# Patient Record
Sex: MALE | Race: BLACK OR AFRICAN AMERICAN | NOT HISPANIC OR LATINO | Employment: FULL TIME | URBAN - METROPOLITAN AREA
[De-identification: names, ages, dates, MRNs, and addresses within clinical notes are randomized per-mention and may not be internally consistent; named-entity substitution may affect disease eponyms.]

---

## 2023-08-01 DIAGNOSIS — M25.551 BILATERAL HIP PAIN: Primary | ICD-10-CM

## 2023-08-01 DIAGNOSIS — M25.552 BILATERAL HIP PAIN: Primary | ICD-10-CM

## 2023-08-07 ENCOUNTER — OFFICE VISIT (OUTPATIENT)
Dept: ORTHOPEDICS | Facility: CLINIC | Age: 53
End: 2023-08-07
Payer: COMMERCIAL

## 2023-08-07 ENCOUNTER — HOSPITAL ENCOUNTER (OUTPATIENT)
Dept: RADIOLOGY | Facility: HOSPITAL | Age: 53
Discharge: HOME | End: 2023-08-07
Attending: ORTHOPAEDIC SURGERY
Payer: COMMERCIAL

## 2023-08-07 VITALS — BODY MASS INDEX: 47.74 KG/M2 | WEIGHT: 315 LBS | HEIGHT: 68 IN

## 2023-08-07 DIAGNOSIS — Z96.642 HX OF TOTAL HIP ARTHROPLASTY, LEFT: ICD-10-CM

## 2023-08-07 DIAGNOSIS — M25.552 BILATERAL HIP PAIN: ICD-10-CM

## 2023-08-07 DIAGNOSIS — M54.17 LUMBOSACRAL RADICULOPATHY: Primary | ICD-10-CM

## 2023-08-07 DIAGNOSIS — Z96.641 H/O TOTAL HIP ARTHROPLASTY, RIGHT: ICD-10-CM

## 2023-08-07 DIAGNOSIS — Z87.39 HISTORY OF RIGHT FOOT DROP: ICD-10-CM

## 2023-08-07 DIAGNOSIS — R25.2 MUSCLE CRAMPS AT NIGHT: ICD-10-CM

## 2023-08-07 DIAGNOSIS — M25.551 BILATERAL HIP PAIN: ICD-10-CM

## 2023-08-07 PROBLEM — M19.90 OSTEOARTHRITIS: Status: ACTIVE | Noted: 2023-08-07

## 2023-08-07 PROBLEM — E78.00 PURE HYPERCHOLESTEROLEMIA: Status: ACTIVE | Noted: 2022-12-13

## 2023-08-07 PROBLEM — N18.32 STAGE 3B CHRONIC KIDNEY DISEASE: Status: ACTIVE | Noted: 2021-10-04

## 2023-08-07 PROBLEM — K21.00 GASTROESOPHAGEAL REFLUX DISEASE WITH ESOPHAGITIS WITHOUT HEMORRHAGE: Status: ACTIVE | Noted: 2023-08-07

## 2023-08-07 PROBLEM — I10 HYPERTENSIVE DISORDER: Status: ACTIVE | Noted: 2023-08-07

## 2023-08-07 PROBLEM — S83.8X2A SPRAIN OF OTHER SPECIFIED PARTS OF LEFT KNEE, INITIAL ENCOUNTER: Status: ACTIVE | Noted: 2023-07-10

## 2023-08-07 PROCEDURE — 73521 X-RAY EXAM HIPS BI 2 VIEWS: CPT | Mod: 26,,, | Performed by: RADIOLOGY

## 2023-08-07 PROCEDURE — 99999 PR PBB SHADOW E&M-EST. PATIENT-LVL IV: ICD-10-PCS | Mod: PBBFAC,,, | Performed by: ORTHOPAEDIC SURGERY

## 2023-08-07 PROCEDURE — 3008F BODY MASS INDEX DOCD: CPT | Mod: CPTII,S$GLB,, | Performed by: ORTHOPAEDIC SURGERY

## 2023-08-07 PROCEDURE — 1159F PR MEDICATION LIST DOCUMENTED IN MEDICAL RECORD: ICD-10-PCS | Mod: CPTII,S$GLB,, | Performed by: ORTHOPAEDIC SURGERY

## 2023-08-07 PROCEDURE — 99204 PR OFFICE/OUTPT VISIT, NEW, LEVL IV, 45-59 MIN: ICD-10-PCS | Mod: S$GLB,,, | Performed by: ORTHOPAEDIC SURGERY

## 2023-08-07 PROCEDURE — 3008F PR BODY MASS INDEX (BMI) DOCUMENTED: ICD-10-PCS | Mod: CPTII,S$GLB,, | Performed by: ORTHOPAEDIC SURGERY

## 2023-08-07 PROCEDURE — 99999 PR PBB SHADOW E&M-EST. PATIENT-LVL IV: CPT | Mod: PBBFAC,,, | Performed by: ORTHOPAEDIC SURGERY

## 2023-08-07 PROCEDURE — 1159F MED LIST DOCD IN RCRD: CPT | Mod: CPTII,S$GLB,, | Performed by: ORTHOPAEDIC SURGERY

## 2023-08-07 PROCEDURE — 99204 OFFICE O/P NEW MOD 45 MIN: CPT | Mod: S$GLB,,, | Performed by: ORTHOPAEDIC SURGERY

## 2023-08-07 PROCEDURE — 73521 X-RAY EXAM HIPS BI 2 VIEWS: CPT | Mod: TC

## 2023-08-07 PROCEDURE — 73521 XR HIPS BILATERAL 2 VIEW INCL AP PELVIS: ICD-10-PCS | Mod: 26,,, | Performed by: RADIOLOGY

## 2023-08-07 RX ORDER — AMLODIPINE BESYLATE 10 MG/1
10 TABLET ORAL
COMMUNITY
Start: 2023-06-13

## 2023-08-07 RX ORDER — SODIUM ZIRCONIUM CYCLOSILICATE 10 G/10G
POWDER, FOR SUSPENSION ORAL
COMMUNITY
Start: 2023-06-13

## 2023-08-07 RX ORDER — SPIRONOLACTONE 50 MG/1
50 TABLET, FILM COATED ORAL EVERY MORNING
COMMUNITY
Start: 2023-04-27

## 2023-08-07 RX ORDER — NAPROXEN SODIUM 220 MG/1
81 TABLET, FILM COATED ORAL DAILY
COMMUNITY

## 2023-08-07 RX ORDER — AMOXICILLIN 500 MG
CAPSULE ORAL
COMMUNITY

## 2023-08-07 RX ORDER — SODIUM BICARBONATE 650 MG/1
TABLET ORAL
COMMUNITY
Start: 2023-07-13

## 2023-08-07 RX ORDER — CARVEDILOL 25 MG/1
25 TABLET ORAL
COMMUNITY
Start: 2023-02-01

## 2023-08-07 RX ORDER — PREGABALIN 50 MG/1
50 CAPSULE ORAL 3 TIMES DAILY
Qty: 90 CAPSULE | Refills: 6 | Status: SHIPPED | OUTPATIENT
Start: 2023-08-07 | End: 2024-02-05

## 2023-08-07 RX ORDER — HYDROCODONE BITARTRATE AND ACETAMINOPHEN 10; 325 MG/1; MG/1
1 TABLET ORAL 3 TIMES DAILY
COMMUNITY
Start: 2023-03-01

## 2023-08-07 RX ORDER — ATORVASTATIN CALCIUM 20 MG/1
20 TABLET, FILM COATED ORAL
COMMUNITY
Start: 2023-08-02

## 2023-08-07 RX ORDER — IBUPROFEN 100 MG/5ML
SUSPENSION, ORAL (FINAL DOSE FORM) ORAL
COMMUNITY

## 2023-08-07 RX ORDER — AMLODIPINE BESYLATE 100 %
POWDER (GRAM) MISCELLANEOUS
COMMUNITY

## 2023-08-07 RX ORDER — GABAPENTIN 400 MG/1
CAPSULE ORAL
COMMUNITY
End: 2023-08-07

## 2023-08-07 RX ORDER — OLMESARTAN MEDOXOMIL AND HYDROCHLOROTHIAZIDE 40/25 40; 25 MG/1; MG/1
1 TABLET ORAL
COMMUNITY
Start: 2023-07-13

## 2023-08-07 RX ORDER — ONDANSETRON 4 MG/1
4 TABLET, FILM COATED ORAL 2 TIMES DAILY PRN
COMMUNITY
Start: 2023-03-22

## 2023-08-07 RX ORDER — OXYCODONE AND ACETAMINOPHEN 10; 325 MG/1; MG/1
1 TABLET ORAL 3 TIMES DAILY
COMMUNITY
Start: 2023-07-28

## 2023-08-07 RX ORDER — PANTOPRAZOLE SODIUM 40 MG/1
40 TABLET, DELAYED RELEASE ORAL
COMMUNITY
Start: 2023-07-13

## 2023-08-07 RX ORDER — PROMETHAZINE HYDROCHLORIDE 25 MG/1
25 TABLET ORAL
COMMUNITY
Start: 2023-08-02

## 2023-08-07 NOTE — PROGRESS NOTES
Subjective:     Patient ID: Fausto Oscar is a 53 y.o. male.    Chief Complaint: Pain of the Right Hip and Pain of the Left Hip    HPI:  Severe right lower extremity pain and burning cessation to below the knee anterior medial aspect of the tibia     08/07/2023   Status post right JANAY 12/2015 and left JANAY 03/2011 by me using [employer] components.  He has been doing well with the hips until he fell around 3 weeks ago started with severe pain in the legs.  He wants to make sure nothing went wrong with his hips.  At 1 point he was taking gabapentin he had stop this been more than a year ago and did not help.  He sees pain management Dr. Blakely any takes oxycodone.  He recently seen by Dr. Mcgee earlier and he was told he as damage nerves any as a footdrop.  He does see Dr. Kierra tucker for his back as a surgeon in case he needs surgery any had an MRI done.  His main concern is cramps in the legs and burning pain in the right lower extremity below the knee.  He occasionally gets he had on the left side.  He does have evidence of lumbar issues and lumbar degenerative changes.  In the past he did receive injections in the back.  He uses a cane to walk around.  He has pain is 8/10.  Since I have met him he has been the same size he weighs today 321 lb and BMI 48.87.  I did tell him that the more he loses weight the last problems he would have but he had tried that route before without success  No fever no chills no shortness of breath or difficulty with chewing swallowing loss of bowel bladder control blurry vision double vision loss sense smell or taste    Past Medical History:   Diagnosis Date    Hypertension     Stage 3 chronic kidney disease      Past Surgical History:   Procedure Laterality Date    FOOT SURGERY      HIP REPLACEMENT ARTHROPLASTY Left 03/2011    HIP REPLACEMENT ARTHROPLASTY Right 12/2015     Family History   Problem Relation Age of Onset    Hypertension Mother     Heart failure Father     Heart  failure Brother      Social History     Socioeconomic History    Marital status:    Tobacco Use    Smoking status: Never    Smokeless tobacco: Never     Medication List with Changes/Refills   New Medications    PREGABALIN (LYRICA) 50 MG CAPSULE    Take 1 capsule (50 mg total) by mouth 3 (three) times daily.   Current Medications    AMLODIPINE (NORVASC) 10 MG TABLET    Take 10 mg by mouth.    AMLODIPINE BESYLATE, BULK, 100 % POWD    amlodipine besylate (bulk) Take No date recorded No form recorded No frequency recorded No route recorded No set duration recorded No set duration amount recorded active No dosage strength recorded No dosage strength units of measure recorded    ASCORBIC ACID, VITAMIN C, (VITAMIN C) 1000 MG TABLET        ASPIRIN 81 MG CHEW    Take 81 mg by mouth once daily.    ATORVASTATIN (LIPITOR) 20 MG TABLET    Take 20 mg by mouth.    CARVEDILOL (COREG) 25 MG TABLET    Take 25 mg by mouth.    HYDROCODONE-ACETAMINOPHEN (NORCO)  MG PER TABLET    Take 1 tablet by mouth 3 (three) times daily.    LOKELMA 10 GRAM PACKET    SMARTSI Packet(s) By Mouth 3 Times a Week    OLMESARTAN-HYDROCHLOROTHIAZIDE (BENICAR HCT) 40-25 MG PER TABLET    Take 1 tablet by mouth.    OMEGA-3 FATTY ACIDS/FISH OIL (FISH OIL-OMEGA-3 FATTY ACIDS) 300-1,000 MG CAPSULE    Take by mouth.    ONDANSETRON (ZOFRAN) 4 MG TABLET    Take 4 mg by mouth 2 (two) times daily as needed.    OXYCODONE-ACETAMINOPHEN (PERCOCET)  MG PER TABLET    Take 1 tablet by mouth 3 (three) times daily.    PANTOPRAZOLE (PROTONIX) 40 MG TABLET    Take 40 mg by mouth.    PROMETHAZINE (PHENERGAN) 25 MG TABLET    Take 25 mg by mouth.    SODIUM BICARBONATE 650 MG TABLET    Take by mouth.    SPIRONOLACTONE (ALDACTONE) 50 MG TABLET    Take 50 mg by mouth every morning.   Discontinued Medications    GABAPENTIN (NEURONTIN) 400 MG CAPSULE    gabapentin Take No date recorded No form recorded No frequency recorded No route recorded No set duration recorded  No set duration amount recorded active No dosage strength recorded No dosage strength units of measure recorded     Review of patient's allergies indicates:  No Known Allergies  Review of Systems   Constitutional: Negative for decreased appetite.   HENT:  Negative for tinnitus.    Eyes:  Negative for double vision.   Cardiovascular:  Negative for chest pain.   Respiratory:  Negative for wheezing.    Hematologic/Lymphatic: Negative for bleeding problem.   Skin:  Negative for dry skin.   Musculoskeletal:  Positive for back pain and muscle cramps. Negative for arthritis, gout, neck pain and stiffness.   Gastrointestinal:  Negative for abdominal pain.   Genitourinary:  Negative for bladder incontinence.   Neurological:  Positive for numbness. Negative for paresthesias and sensory change.   Psychiatric/Behavioral:  Negative for altered mental status.        Objective:   Body mass index is 48.87 kg/m².  There were no vitals filed for this visit.       General    Constitutional: He is oriented to person, place, and time. He appears well-developed.   HENT:   Head: Atraumatic.   Eyes: EOM are normal.   Pulmonary/Chest: Effort normal.   Neurological: He is alert and oriented to person, place, and time.   Psychiatric: Judgment normal.           Positive low back pain nonspecific in the lumbar area paraspinal   Pelvis is level in the sitting position   Passive hip internal external rotation without pain in the groin.  There is no pain to palpation over the greater trochanters.  There is no proximal thigh pain.  Able to do hip flexion and abduction and adduction   Right knee 0-120 degrees of flexion.  Collaterals and cruciates stable.  There is no swelling no effusion.  No defect in the patella or quadriceps tendon.  Left knee 0-130 degrees of flexion.  Collaterals and cruciates stable.  There is no swelling or effusion.  No defect in the patella or quadriceps tendon.  There is no specific pain in the joint space  There is  slight pitting edema around the ankle  Calves are soft nontender   Skin is warm to touch no obvious lesions   There is decreased sensation to touch right over the anteromedial aspect of the tibia  There is slight weakness on the right foot to extension but able to do it at 4/5      Relevant imaging results reviewed and interpreted by me, discussed with the patient and / or family today     X-ray 08/07/2023 bilateral total hip replacement in excellent alignment no evidence of failure.  There is slight leg-length discrepancy of 1 cm.  Multiple lumbar degenerative changes  Assessment:     Encounter Diagnoses   Name Primary?    Lumbosacral radiculopathy Yes    H/O total hip arthroplasty, right     Hx of total hip arthroplasty, left     History of right foot drop     Muscle cramps at night         Plan:   Lumbosacral radiculopathy  -     pregabalin (LYRICA) 50 MG capsule; Take 1 capsule (50 mg total) by mouth 3 (three) times daily.  Dispense: 90 capsule; Refill: 6    H/O total hip arthroplasty, right    Hx of total hip arthroplasty, left    History of right foot drop    Muscle cramps at night         Patient Instructions   Are taking Lipitor for cholesterol and that could give you a lot of cramps  I recommend you starting Co Q10 over-the-counter/enzyme which could help decrease the amount of cramps resulting from the Lipitor  You tried gabapentin before you have not taking it it has been awhile in you do have burning and numbness and tingling down the leg and it did not help  I will start you on Lyrica/pregabalin 50 mg 3 times a day You start with 1 pill at night for a week then you go up to 1 pill twice a day for a week and then 1 pill 3 times a day  Your x-rays on both of her hips there is no evidence of failure and looks excellent and there is no fractures  You can not take anti-inflammatories since you have stage 3 kidney disease  You are taking oxycodone for pain as needed  You do have evidence of foot drop by nerve  testing by Dr. Mcgee on the right side you have weakness but not severe  You already tried Voltaren cream over-the-counter   Maybe should get what we call compound cream  As far as her hips are concerned they all doing well right now you need to follow-up with         Disclaimer: This note was prepared using a voice recognition system and is likely to have sound alike errors within the text.

## 2023-08-07 NOTE — PATIENT INSTRUCTIONS
Are taking Lipitor for cholesterol and that could give you a lot of cramps  I recommend you starting Co Q10 over-the-counter/enzyme which could help decrease the amount of cramps resulting from the Lipitor  You tried gabapentin before you have not taking it it has been awhile in you do have burning and numbness and tingling down the leg and it did not help  I will start you on Lyrica/pregabalin 50 mg 3 times a day You start with 1 pill at night for a week then you go up to 1 pill twice a day for a week and then 1 pill 3 times a day  Your x-rays on both of her hips there is no evidence of failure and looks excellent and there is no fractures  You can not take anti-inflammatories since you have stage 3 kidney disease  You are taking oxycodone for pain as needed  You do have evidence of foot drop by nerve testing by Dr. Mcgee on the right side you have weakness but not severe  You already tried Voltaren cream over-the-counter   Maybe should get what we call compound cream  As far as her hips are concerned they all doing well right now you need to follow-up with

## 2023-09-27 ENCOUNTER — TELEPHONE (OUTPATIENT)
Dept: NEUROSURGERY | Facility: CLINIC | Age: 53
End: 2023-09-27
Payer: COMMERCIAL

## 2023-09-27 NOTE — TELEPHONE ENCOUNTER
Patient has been scheduled for the next available NP appointment for 10/26 @ 11 AM. Patient has been instructed to bring the most recent imaging performed by Noble Luis MD and prior imaging from [facility] for 2022 for comparison. Pt states that he has all imaging reports/disc and will bring them to the appointment. Patient verbalized understanding. Appointment date and time confirmed.

## 2023-10-26 ENCOUNTER — OFFICE VISIT (OUTPATIENT)
Dept: NEUROSURGERY | Facility: CLINIC | Age: 53
End: 2023-10-26
Payer: COMMERCIAL

## 2023-10-26 ENCOUNTER — HOSPITAL ENCOUNTER (OUTPATIENT)
Dept: RADIOLOGY | Facility: HOSPITAL | Age: 53
Discharge: HOME | End: 2023-10-26
Attending: NEUROLOGICAL SURGERY
Payer: COMMERCIAL

## 2023-10-26 VITALS
DIASTOLIC BLOOD PRESSURE: 92 MMHG | HEIGHT: 68 IN | WEIGHT: 315 LBS | HEART RATE: 71 BPM | SYSTOLIC BLOOD PRESSURE: 164 MMHG | BODY MASS INDEX: 47.74 KG/M2

## 2023-10-26 DIAGNOSIS — M43.17 SPONDYLOLISTHESIS AT L5-S1 LEVEL: Primary | ICD-10-CM

## 2023-10-26 DIAGNOSIS — M43.17 SPONDYLOLISTHESIS AT L5-S1 LEVEL: ICD-10-CM

## 2023-10-26 DIAGNOSIS — M43.07 SPONDYLOLYSIS OF LUMBOSACRAL REGION: ICD-10-CM

## 2023-10-26 DIAGNOSIS — M54.9 DORSALGIA, UNSPECIFIED: ICD-10-CM

## 2023-10-26 PROCEDURE — 72120 X-RAY BEND ONLY L-S SPINE: CPT | Mod: 26,,, | Performed by: RADIOLOGY

## 2023-10-26 PROCEDURE — 72120 X-RAY BEND ONLY L-S SPINE: CPT | Mod: TC

## 2023-10-26 PROCEDURE — 3077F SYST BP >= 140 MM HG: CPT | Mod: CPTII,S$GLB,, | Performed by: NEUROLOGICAL SURGERY

## 2023-10-26 PROCEDURE — 4010F PR ACE/ARB THEARPY RXD/TAKEN: ICD-10-PCS | Mod: CPTII,S$GLB,, | Performed by: NEUROLOGICAL SURGERY

## 2023-10-26 PROCEDURE — 72120 XR LUMBAR SPINE FLEXION AND EXTENSION ONLY: ICD-10-PCS | Mod: 26,,, | Performed by: RADIOLOGY

## 2023-10-26 PROCEDURE — 3008F BODY MASS INDEX DOCD: CPT | Mod: CPTII,S$GLB,, | Performed by: NEUROLOGICAL SURGERY

## 2023-10-26 PROCEDURE — 3080F PR MOST RECENT DIASTOLIC BLOOD PRESSURE >= 90 MM HG: ICD-10-PCS | Mod: CPTII,S$GLB,, | Performed by: NEUROLOGICAL SURGERY

## 2023-10-26 PROCEDURE — 4010F ACE/ARB THERAPY RXD/TAKEN: CPT | Mod: CPTII,S$GLB,, | Performed by: NEUROLOGICAL SURGERY

## 2023-10-26 PROCEDURE — 99204 PR OFFICE/OUTPT VISIT, NEW, LEVL IV, 45-59 MIN: ICD-10-PCS | Mod: S$GLB,,, | Performed by: NEUROLOGICAL SURGERY

## 2023-10-26 PROCEDURE — 3077F PR MOST RECENT SYSTOLIC BLOOD PRESSURE >= 140 MM HG: ICD-10-PCS | Mod: CPTII,S$GLB,, | Performed by: NEUROLOGICAL SURGERY

## 2023-10-26 PROCEDURE — 3008F PR BODY MASS INDEX (BMI) DOCUMENTED: ICD-10-PCS | Mod: CPTII,S$GLB,, | Performed by: NEUROLOGICAL SURGERY

## 2023-10-26 PROCEDURE — 1159F PR MEDICATION LIST DOCUMENTED IN MEDICAL RECORD: ICD-10-PCS | Mod: CPTII,S$GLB,, | Performed by: NEUROLOGICAL SURGERY

## 2023-10-26 PROCEDURE — 99999 PR PBB SHADOW E&M-EST. PATIENT-LVL IV: CPT | Mod: PBBFAC,,, | Performed by: NEUROLOGICAL SURGERY

## 2023-10-26 PROCEDURE — 99204 OFFICE O/P NEW MOD 45 MIN: CPT | Mod: S$GLB,,, | Performed by: NEUROLOGICAL SURGERY

## 2023-10-26 PROCEDURE — 3080F DIAST BP >= 90 MM HG: CPT | Mod: CPTII,S$GLB,, | Performed by: NEUROLOGICAL SURGERY

## 2023-10-26 PROCEDURE — 1159F MED LIST DOCD IN RCRD: CPT | Mod: CPTII,S$GLB,, | Performed by: NEUROLOGICAL SURGERY

## 2023-10-26 PROCEDURE — 99999 PR PBB SHADOW E&M-EST. PATIENT-LVL IV: ICD-10-PCS | Mod: PBBFAC,,, | Performed by: NEUROLOGICAL SURGERY

## 2023-10-26 NOTE — PROGRESS NOTES
Subjective:      Patient ID: Fausto Oscar is a 53 y.o. male.    Chief Complaint: Back Pain, Difficulty Walking, and Extremity Pain (Pt visit for mid back pain causing sharp and shocking pain rate 7/10 pt experience pinch nerve sensation in left leg and nerve damage in right leg , numbness and tingling in both feet )    Patient here for evaluation lower back pain   Long hx of LBP going through the lower extremities   Rates symptoms as 7/10 \  Worse with activity and better with rest   Ambulates with a cane   Denies BB symptoms   Nerve issues on the R   States that hsi legs feel weak and will give out   Works as a  12 hours shift  Sensation intact   His BMI is 50 and has been to general surgeon to talk about gastric sleeve   He has tried pain management and they were discussing possible spinal cord stimulator placement prior to referral  He has a known grade 1 2 spondylolisthesis at L5-S1  Medications only temporary alleviate symptoms and he is had multiple injections without lasting relief        Review of Systems   Constitutional:  Negative for activity change, appetite change and chills.   HENT:  Negative for hearing loss, sore throat and tinnitus.    Eyes:  Negative for pain, discharge and itching.   Cardiovascular:  Negative for chest pain.   Gastrointestinal:  Negative for abdominal pain.   Endocrine: Negative for cold intolerance and heat intolerance.   Genitourinary:  Negative for difficulty urinating and dysuria.   Musculoskeletal:  Positive for back pain and gait problem.   Allergic/Immunologic: Negative for environmental allergies.   Neurological:  Positive for weakness. Negative for dizziness, tremors, light-headedness and headaches.   Hematological:  Negative for adenopathy.   Psychiatric/Behavioral:  Negative for agitation, behavioral problems and confusion.          Objective:       Physical Exam:  Nursing note and vitals reviewed.    Constitutional: He appears well-nourished. He is not  diaphoretic. No distress.     Eyes: Pupils are equal, round, and reactive to light. EOM are normal.     Cardiovascular: Normal rate and regular rhythm.     Psych/Behavior: He is alert. He is oriented to person, place, and time. He has a normal mood and affect.     Musculoskeletal:        Back: Range of motion is limited. There is tenderness. Muscle strength is 5/5.       Right Lower Extremities: Range of motion is full. There is no tenderness. Muscle strength is 5/5. Tone is normal.        Left Lower Extremities: There is no tenderness. Muscle strength is 5/5. Tone is normal.     Neurological:        Sensory: There is no sensory deficit in the trunk. There is no sensory deficit in the extremities.        Cranial nerves: Cranial nerve(s) II, III, IV, V, VI, VII, VIII, IX, X, XI and XII are intact.     General    Nursing note and vitals reviewed.  Constitutional: He is oriented to person, place, and time. He appears well-nourished. No distress.   Eyes: EOM are normal. Pupils are equal, round, and reactive to light.   Cardiovascular:  Normal rate and regular rhythm.            Neurological: He is alert and oriented to person, place, and time.   Psychiatric: He has a normal mood and affect.             Ortho Exam      MR lumbar shows G1-2 spondylolisthesis   L5-S1 with severe bilateral foraminal narrowing L5  There is a disc desiccation with posterior bulge at L2-3         I  reviewed all pertinent imaging regarding this case.  Assessment:     1. Spondylolisthesis at L5-S1 level    2. Dorsalgia, unspecified    3. Spondylolysis of lumbosacral region      Plan:     Spondylolisthesis at L5-S1 level  -     X-Ray Lumbar Spine Flexion And Extension Only; Future; Expected date: 10/26/2023  -     CT Lumbar Spine Without Contrast; Future; Expected date: 10/26/2023    Dorsalgia, unspecified  -     X-Ray Lumbar Spine Flexion And Extension Only; Future; Expected date: 10/26/2023  -     CT Lumbar Spine Without Contrast; Future;  Expected date: 10/26/2023    Spondylolysis of lumbosacral region  -     X-Ray Lumbar Spine Flexion And Extension Only; Future; Expected date: 10/26/2023  -     CT Lumbar Spine Without Contrast; Future; Expected date: 10/26/2023    Patient with spondylolysis L5-S1 grade 1-2 spondylolisthesis at this level  Severe bilateral foraminal narrowing  Upload MRI into our PACS system  Get CT lumbar to evaluate the bony anatomy  Get XR lumbar flexion-extension to look for instability  Patient understands would likely require surgical correction however prior to this he has been evaluated for potential gastric sleeve in order to eventually become a candidate for surgical correction for his back  We will get these images completed to discuss what surgical correction would require    Thank you for the referral   Please call with any questions    Juan A Stuart MD  Neurosurgery     Disclaimer: This note was prepared using a voice recognition system and is likely to have sound alike errors within the text.

## 2023-11-13 ENCOUNTER — TELEPHONE (OUTPATIENT)
Dept: NEUROSURGERY | Facility: CLINIC | Age: 53
End: 2023-11-13
Payer: COMMERCIAL

## 2023-11-13 NOTE — TELEPHONE ENCOUNTER
----- Message from Jsoselyn Moyer sent at 11/10/2023  1:07 PM CST -----  Contact: Fausto Lambert is calling to speak to the nurse regarding his imaging disc he left for his MRI, please give patient a call back at [phone]    Thanks  ALLEN

## 2023-11-13 NOTE — TELEPHONE ENCOUNTER
Patient has been informed that I will mail his disc on Friday when returning to [facility] location. Patient verbalized understanding.

## 2023-11-27 ENCOUNTER — TELEPHONE (OUTPATIENT)
Dept: NEUROSURGERY | Facility: CLINIC | Age: 53
End: 2023-11-27
Payer: COMMERCIAL

## 2023-11-27 NOTE — TELEPHONE ENCOUNTER
I spoke with Radiology; the patient's disc is at [facility] in the  ([facility]). I spoke with the patient, who needs his 2-imaging disc mailed to him. I have messaged the manager, Lolis, regarding pt's request. She will check for the patient's disc. Patient verbalized understanding that I would be in contact when disc has been located and mailed.

## 2023-11-27 NOTE — TELEPHONE ENCOUNTER
----- Message from Karina Varma sent at 11/21/2023  2:36 PM CST -----  Name of Who is Calling:LLOYD BRUCE [[id number]]        What is the request in detail: Pt would like a call back from the office to discuss if MRI has been mailed. Please advise thank you       Can the clinic reply by [facility]:NO        What Number to Call Back if not in [facility]:.Telephone Information:  Mobile          [phone]

## 2023-12-14 ENCOUNTER — HOSPITAL ENCOUNTER (OUTPATIENT)
Dept: RADIOLOGY | Facility: HOSPITAL | Age: 53
Discharge: HOME | End: 2023-12-14
Attending: NEUROLOGICAL SURGERY
Payer: COMMERCIAL

## 2023-12-14 ENCOUNTER — OFFICE VISIT (OUTPATIENT)
Dept: NEUROSURGERY | Facility: CLINIC | Age: 53
End: 2023-12-14
Payer: COMMERCIAL

## 2023-12-14 VITALS
DIASTOLIC BLOOD PRESSURE: 86 MMHG | SYSTOLIC BLOOD PRESSURE: 176 MMHG | BODY MASS INDEX: 47.63 KG/M2 | WEIGHT: 313.25 LBS | HEART RATE: 75 BPM

## 2023-12-14 DIAGNOSIS — M51.36 DEGENERATIVE DISC DISEASE, LUMBAR: Primary | ICD-10-CM

## 2023-12-14 DIAGNOSIS — M43.16 SPONDYLOLISTHESIS, LUMBAR REGION: ICD-10-CM

## 2023-12-14 DIAGNOSIS — M54.16 LUMBAR RADICULOPATHY: ICD-10-CM

## 2023-12-14 DIAGNOSIS — M43.07 SPONDYLOLYSIS OF LUMBOSACRAL REGION: ICD-10-CM

## 2023-12-14 DIAGNOSIS — M43.17 SPONDYLOLISTHESIS AT L5-S1 LEVEL: ICD-10-CM

## 2023-12-14 DIAGNOSIS — M48.062 LUMBAR STENOSIS WITH NEUROGENIC CLAUDICATION: ICD-10-CM

## 2023-12-14 DIAGNOSIS — M54.9 DORSALGIA, UNSPECIFIED: ICD-10-CM

## 2023-12-14 PROCEDURE — 99213 OFFICE O/P EST LOW 20 MIN: CPT | Mod: S$GLB,,, | Performed by: NEUROLOGICAL SURGERY

## 2023-12-14 PROCEDURE — 1159F MED LIST DOCD IN RCRD: CPT | Mod: CPTII,S$GLB,, | Performed by: NEUROLOGICAL SURGERY

## 2023-12-14 PROCEDURE — 3079F PR MOST RECENT DIASTOLIC BLOOD PRESSURE 80-89 MM HG: ICD-10-PCS | Mod: CPTII,S$GLB,, | Performed by: NEUROLOGICAL SURGERY

## 2023-12-14 PROCEDURE — 4010F PR ACE/ARB THEARPY RXD/TAKEN: ICD-10-PCS | Mod: CPTII,S$GLB,, | Performed by: NEUROLOGICAL SURGERY

## 2023-12-14 PROCEDURE — 3079F DIAST BP 80-89 MM HG: CPT | Mod: CPTII,S$GLB,, | Performed by: NEUROLOGICAL SURGERY

## 2023-12-14 PROCEDURE — 3008F BODY MASS INDEX DOCD: CPT | Mod: CPTII,S$GLB,, | Performed by: NEUROLOGICAL SURGERY

## 2023-12-14 PROCEDURE — 99999 PR PBB SHADOW E&M-EST. PATIENT-LVL IV: CPT | Mod: PBBFAC,,, | Performed by: NEUROLOGICAL SURGERY

## 2023-12-14 PROCEDURE — 4010F ACE/ARB THERAPY RXD/TAKEN: CPT | Mod: CPTII,S$GLB,, | Performed by: NEUROLOGICAL SURGERY

## 2023-12-14 PROCEDURE — 99213 PR OFFICE/OUTPT VISIT, EST, LEVL III, 20-29 MIN: ICD-10-PCS | Mod: S$GLB,,, | Performed by: NEUROLOGICAL SURGERY

## 2023-12-14 PROCEDURE — 72131 CT LUMBAR SPINE W/O DYE: CPT | Mod: 26,,, | Performed by: RADIOLOGY

## 2023-12-14 PROCEDURE — 3077F SYST BP >= 140 MM HG: CPT | Mod: CPTII,S$GLB,, | Performed by: NEUROLOGICAL SURGERY

## 2023-12-14 PROCEDURE — 3077F PR MOST RECENT SYSTOLIC BLOOD PRESSURE >= 140 MM HG: ICD-10-PCS | Mod: CPTII,S$GLB,, | Performed by: NEUROLOGICAL SURGERY

## 2023-12-14 PROCEDURE — 3008F PR BODY MASS INDEX (BMI) DOCUMENTED: ICD-10-PCS | Mod: CPTII,S$GLB,, | Performed by: NEUROLOGICAL SURGERY

## 2023-12-14 PROCEDURE — 72131 CT LUMBAR SPINE WITHOUT CONTRAST: ICD-10-PCS | Mod: 26,,, | Performed by: RADIOLOGY

## 2023-12-14 PROCEDURE — 72131 CT LUMBAR SPINE W/O DYE: CPT | Mod: TC

## 2023-12-14 PROCEDURE — 99999 PR PBB SHADOW E&M-EST. PATIENT-LVL IV: ICD-10-PCS | Mod: PBBFAC,,, | Performed by: NEUROLOGICAL SURGERY

## 2023-12-14 PROCEDURE — 1159F PR MEDICATION LIST DOCUMENTED IN MEDICAL RECORD: ICD-10-PCS | Mod: CPTII,S$GLB,, | Performed by: NEUROLOGICAL SURGERY

## 2023-12-14 NOTE — PATIENT INSTRUCTIONS
Spinal Fusion  If you have leg pain or numbness/weakness in addition to back pain, your surgeon may perform a decompression (laminectomy) and/or a discectomy. This procedure involves removing bone and diseased tissues (i.e. ruptured disc) that are putting pressure on spinal nerves. If it is deemed necessary to remove the type or amount of bone that would decrease the stabilization of the spine, a spinal fusion may be indicated.        Spinal fusion becomes necessary when two or more vertebrae are compressed -- this could be due to a degenerative spine condition such as a herniated disc, spondylolisthesis, spondylosis (arthritis) or an injury. The loss of proper spacing and cushioning between vertebrae can lead to debilitating back and leg pain that can only be resolved through surgery. Spinal fusion surgery restores the space between the vertebrae and fuses the bones in place using instrumentation (rods and screws) to hold them in position and bone grafts (a slurry of bone material that hardens over time) to permanently prevent the vertebrae from moving. It also prevents the stretching of nerves, surrounding ligaments and muscles. It is an option when motion is the source of pain, such as movement that occurs in a part of the spine that is arthritic or unstable due to injury, disease, or the normal aging process.                                                           Fusion will take away some spinal flexibility, but most spinal fusions involve only small segments of the spine and do not limit motion very much. The majority of patients will not notice a decrease in range of motion. Your surgeon will talk with you about whether your specific procedure may impact flexibility or range of motion in your spine.    Open Surgery vs. Minimally Invasive Surgery  Traditionally, spine surgery is usually performed as open surgery. This entails opening the operative site with a long incision, so the surgeon can view  and access the spinal anatomy. However, technology has advanced to the point where more spine conditions can be treated with minimally invasive techniques.                     Open Surgery           Minimally Invasive (MIS)    Because minimally invasive spine surgery (MIS) does not involve long incisions, open manipulation of the muscles and tissue surrounding the spine is avoided, leading to shorter operative time. In general, reducing intraoperative (during surgery) manipulation of soft tissues results in less postoperative pain and a faster recovery.  Not all patients are appropriate candidates for MIS procedures. There needs to be relative certainty that the same or better results can be achieved through MIS techniques as with an open procedure.      Surgical Approaches  Whether open surgery or MIS, the spine can be accessed from different directions. These are referred to as surgical approaches and are explained below:      Anterior Lumbar Interbody Fusion (ALIF): As the name implies, the surgeon accesses the spine from the front of your body, through the abdomen.  Posterior Lumbar Interbody Fusion (PLIF): An incision is made in your back.  Direct Lateral Interbody Fusion (DLIF): The pathway to your spine is made through your side.  Transforaminal Lumbar Interbody Fusion (TLIF): The pathway to your spine is from the back but may involve incisions just to the left and right of the center.    Of these approaches, the MIS TLIF is the most common.      Spinal Instrumentation  Despite the name of the surgery, the spine is not actually fused during lumbar spinal fusion surgery. Instead, during the surgery a bone graft is placed in the spine so that two parts of the vertebrae can gradually grow together into one longer bone. The purpose of instrumentation is to stabilize or fix the spine in position until the fusion solidifies.  Examples of spinal instrumentation include plates, bone screws, rods, and interbody  devices; although, there are other types of devices your surgeon may recommend in treatment of your spinal disorder.   An interbody cage is a permanent prosthesis left in place to maintain the foraminal height (the space between two vertebral bones where the nerve roots exit) and decompression following surgery.  Interspinous process devices (ISP) reduce the load on the facet joints, restore foraminal height, and provide stability in order to improve the clinical outcome of surgery. An advantage of an ISP is that it requires less exposure to place within the spine and therefore is a MIS procedure.  Pedicle screws help to hold the vertebral body in place until the fusion is complete.  ISP                                                             Directly after surgery             After fusion has occurred    Some patients are at-risk for their fusion not to heal properly or completely. Your surgeon may refer to this as a non-fusion, pseudarthrosis or a failed fusion. To help avoid fusion problems, your surgeon may recommend a bone growth stimulator, an external device that emits a series of electrical impulses or ultrasound waves that stimulates bone-forming cells to create and mineralize new bone.    Recovery  A 1 to 5 day hospital stay following fusion surgery is typical, but experiences vary. Patients must be able to get up and walk around on their own before they can go home from the hospital. At times there is a need for more structured rehabilitation before returning home.  It is important to remember, however, that each individual's recovery process will vary to a certain extent depending on several factors. These factors include the status of your spine, prior physical condition, overall   health, the type and extensiveness of lumbar fusion method and the individual's perception of pain and recovery.  Depending on the surgical technique used and the number of levels fused, recovery still may be fairly  quick, such as 6 to 8 weeks with the use of minimally invasive techniques. When a multilevel fusion is done, over 4 levels or more, it can easily take the patient 6 months or more for maximal recovery.  With most fusion procedures, use of a back brace after surgery is common, providing added support and limiting excessive motion of the low back.     The bone continues to mature and solidify over a prolonged period of time, anywhere from 12 to 18 months after the surgery. Because it is a major surgery and the fusion takes a long time to set up, the recovery period plays an important role in the success of a spine fusion.    Postoperative Restrictions  Activity restrictions following lumbar fusions may vary based on surgeon preferences, the number of levels fused, as well as the patient's bone quality and technical aspects of each individual surgical procedure.  Typically, bending, twisting, and lifting activities are limited for the first 6 weeks. During this time, therapeutic exercises are focused on activities of daily living and mild aerobic fitness activities such as walking or stationary bike riding.    More aggressive abdominal and core strengthening exercises as well as aggressive range of motion exercises, are usually not started until after 6-8 weeks of initial healing.    The bone will continue to fuse and evolve over the next couple of years. In the event that there has been significant injury to the nerves, it may take up to two years before it can be determined how much the nerves will recover after the spine fusion surgery.    Any time a surgery is performed there is significant deconditioning of the muscles. Just as the conservative (nonoperative) treatment of low back pain requires commitment to a good exercise program, post-operative rehabilitation after a spine fusion surgery requires the same type of commitment. When patients decide to proceed with fusion of the low back, it is also critical to  make a strong commitment towards the rehabilitation process. Rehabilitation is focused on stretching, strengthening and, just as importantly if not more so, aerobic conditioning.    A frequent concern of patients is their ability to resume both recreational and occupational activities after the spine fusion. As is implied above, the more vigorous the activities, the longer it may take before the patient is able to return to them. However, even strenuous activities can usually be resumed by six months after the spine fusion surgery.    There is a natural anxiety about resuming normal activities, although once the fusion is set, the more the back is stressed, the bigger and stronger the fusion becomes. Bone is a live tissue and responds to stress by growing stronger. It generally takes about three months for the fusion to set, and once it has set up it is not fragile and is very unlikely to break. Stressing the bone involved in the fusion after three months helps the spine fusion to become stronger.              Complicating Factors  There are several factors that can extend one's hospital stay and/or negatively impact the recovery process and clinical success of the surgery. These factors include, but are not limited to:    Smoking (nicotine) - Nicotine in any form is a bone toxin, interfering with bone-growing cells.   Obesity  Osteoporosis  Chronic steroid use  Poorly-controlled Diabetes Mellitus and certain other chronic illnesses  Prior back surgery or attempted fusion  Malnutrition  Post-surgery activities  Non-compliance with brace wearing  Depression  Long-standing use of narcotics before surgery

## 2023-12-14 NOTE — PROGRESS NOTES
Subjective:      Patient ID: Fausto Oscar is a 53 y.o. male.    Chief Complaint: Follow-up (Left LBP picking up objects worsen the pain and medications helps with the pain. Pain is a 8/10.)    Patient here for follow-up  Since last visit he has a CT and an x-ray of the lumbar spine for my review  Pain across the back into the bilateral lower extremities  At last visit we talked about weight loss and patient has lost 20 lb by modifying his activity as well as his diet  Ambulates unassisted  Denies any bowel or bladder symptoms    CT lumbar spine  There is vacuum disc phenomenon at T11/T12, L2/L3 and L3/L4 with mild disc height reduction at L3/L4 and moderate disc height reduction at T11/T12 and L2/L3.  Bilateral L5 pars interarticularis defects with grade 2 anterior spondylolisthesis of L5 on S1, of approximately 9 mm, with complete loss of the L5/S1 disc height with vacuum phenomenon, endplate sclerosis and marginal spondylosis.  Marginal spondylosis at other levels noted.  Minimal convex left curvature of the lumbar spine.         Review of Systems   Constitutional:  Negative for activity change, appetite change and chills.   HENT:  Negative for hearing loss, sore throat and tinnitus.    Eyes:  Negative for pain, discharge and itching.   Cardiovascular:  Negative for chest pain.   Gastrointestinal:  Negative for abdominal pain.   Endocrine: Negative for cold intolerance and heat intolerance.   Genitourinary:  Negative for difficulty urinating and dysuria.   Musculoskeletal:  Positive for back pain and gait problem.   Allergic/Immunologic: Negative for environmental allergies.   Neurological:  Negative for dizziness, tremors, weakness, light-headedness and headaches.   Hematological:  Negative for adenopathy.   Psychiatric/Behavioral:  Negative for agitation, behavioral problems and confusion.          Objective:       Physical Exam:  Nursing note and vitals reviewed.    Constitutional: He appears well-nourished. He  is not diaphoretic. No distress.     Eyes: Pupils are equal, round, and reactive to light. EOM are normal.     Cardiovascular: Normal rate and regular rhythm.     Psych/Behavior: He is alert. He is oriented to person, place, and time. He has a normal mood and affect.     Musculoskeletal:        Back: Range of motion is limited. There is tenderness. Muscle strength is 5/5.       Right Lower Extremities: Range of motion is full. There is no tenderness. Muscle strength is 5/5. Tone is normal.        Left Lower Extremities: There is no tenderness. Muscle strength is 5/5. Tone is normal.     Neurological:        Sensory: There is no sensory deficit in the trunk. There is no sensory deficit in the extremities.        Cranial nerves: Cranial nerve(s) II, III, IV, V, VI, VII, VIII, IX, X, XI and XII are intact.     General    Nursing note and vitals reviewed.  Constitutional: He is oriented to person, place, and time. He appears well-nourished. No distress.   Eyes: EOM are normal. Pupils are equal, round, and reactive to light.   Cardiovascular:  Normal rate and regular rhythm.            Neurological: He is alert and oriented to person, place, and time.   Psychiatric: He has a normal mood and affect.             Ortho Exam          Assessment:     1. Degenerative disc disease, lumbar    2. Spondylolisthesis, lumbar region    3. Lumbar radiculopathy    4. Lumbar stenosis with neurogenic claudication      Plan:     Degenerative disc disease, lumbar    Spondylolisthesis, lumbar region    Lumbar radiculopathy    Lumbar stenosis with neurogenic claudication    I went over the patient's lower back as well as lower extremity symptoms and showed him his pars defect with spondylolisthesis at L5-S1  We discussed surgical treatment for this.  I have continued to encourage him on his weight loss. he is lost 20 lb since I last discussion\  He will follow-up in 2 months and will discuss surgical intervention at the time anterior lumbar  interbody fusion L5-S1       Thank you for the referral   Please call with any questions    Juan A Stuart MD  Neurosurgery     Disclaimer: This note was prepared using a voice recognition system and is likely to have sound alike errors within the text.

## 2024-03-05 ENCOUNTER — TELEPHONE (OUTPATIENT)
Dept: NEUROSURGERY | Facility: CLINIC | Age: 54
End: 2024-03-05
Payer: COMMERCIAL

## 2024-03-05 NOTE — TELEPHONE ENCOUNTER
----- Message from Lubna Alvarez sent at 3/5/2024  2:45 PM CST -----  Contact: Fausto Lambert would like a call back at [phone] in regards to needing to reschedule his appointment that's on Friday 3/8/24, schedule wouldn't populate for me.  Thanks   Am

## 2024-03-05 NOTE — TELEPHONE ENCOUNTER
I spoke with the patient, who has been rescheduled for 0404 at 1 PM. The patient confirmed appointment date and time.

## 2024-03-25 ENCOUNTER — TELEPHONE (OUTPATIENT)
Dept: NEUROSURGERY | Facility: CLINIC | Age: 54
End: 2024-03-25
Payer: COMMERCIAL

## 2024-03-25 NOTE — TELEPHONE ENCOUNTER
I spoke with the patient about his appt that needed to be rescheduled for 04/04 due to the provider being in surgery. The patient was scheduled for 05/02 at 11:30 AM. The patient confirmed appt date and time.

## 2024-04-29 ENCOUNTER — TELEPHONE (OUTPATIENT)
Dept: NEUROSURGERY | Facility: CLINIC | Age: 54
End: 2024-04-29
Payer: COMMERCIAL

## 2024-04-29 NOTE — TELEPHONE ENCOUNTER
The patient requested an appointment in June for follow-up. He was scheduled for 06/13 at 10:30 AM. The patient confirmed appointment date and time.

## 2024-04-29 NOTE — TELEPHONE ENCOUNTER
----- Message from Ghislaine Moon sent at 4/29/2024  2:05 PM CDT -----  Contact: Patient, [phone]  Calling to reschedule his appointment he has on Thursday. Please call him. Thanks.